# Patient Record
Sex: FEMALE
[De-identification: names, ages, dates, MRNs, and addresses within clinical notes are randomized per-mention and may not be internally consistent; named-entity substitution may affect disease eponyms.]

---

## 2018-12-05 ENCOUNTER — HOSPITAL ENCOUNTER (OUTPATIENT)
Dept: HOSPITAL 90 - OIH | Age: 78
Discharge: HOME | End: 2018-12-05
Attending: FAMILY MEDICINE
Payer: COMMERCIAL
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
## 2018-12-05 DIAGNOSIS — S09.90XA: ICD-10-CM
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
## 2018-12-05 DIAGNOSIS — G31.9: Primary | ICD-10-CM

## 2018-12-05 PROCEDURE — 70450 CT HEAD/BRAIN W/O DYE: CPT

## 2019-09-09 ENCOUNTER — HOSPITAL ENCOUNTER (OUTPATIENT)
Dept: HOSPITAL 90 - RAH | Age: 79
Discharge: HOME | End: 2019-09-09
Attending: FAMILY MEDICINE
Payer: COMMERCIAL

## 2019-09-09 DIAGNOSIS — Z12.31: Primary | ICD-10-CM

## 2019-09-09 PROCEDURE — 77067 SCR MAMMO BI INCL CAD: CPT

## 2020-09-13 ENCOUNTER — HOSPITAL ENCOUNTER (OUTPATIENT)
Dept: HOSPITAL 90 - EDH | Age: 80
Setting detail: OBSERVATION
LOS: 1 days | Discharge: HOME | End: 2020-09-14
Attending: INTERNAL MEDICINE | Admitting: INTERNAL MEDICINE
Payer: COMMERCIAL

## 2020-09-13 VITALS — HEIGHT: 63 IN | BODY MASS INDEX: 14.35 KG/M2 | WEIGHT: 81 LBS

## 2020-09-13 VITALS — SYSTOLIC BLOOD PRESSURE: 148 MMHG | DIASTOLIC BLOOD PRESSURE: 71 MMHG

## 2020-09-13 VITALS — DIASTOLIC BLOOD PRESSURE: 72 MMHG | SYSTOLIC BLOOD PRESSURE: 161 MMHG
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
## 2020-09-13 DIAGNOSIS — S32.591A: Primary | ICD-10-CM

## 2020-09-13 DIAGNOSIS — D72.829: ICD-10-CM
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
## 2020-09-13 DIAGNOSIS — E87.6: ICD-10-CM

## 2020-09-13 LAB
ALBUMIN SERPL-MCNC: 2.9 G/DL (ref 3.5–5)
ALT SERPL-CCNC: 30 U/L (ref 12–78)
AST SERPL-CCNC: 14 U/L (ref 10–37)
BASOPHILS NFR BLD AUTO: 0.3 % (ref 0–5)
BILIRUB SERPL-MCNC: 0.4 MG/DL (ref 0.2–1)
BUN SERPL-MCNC: 24 MG/DL (ref 7–18)
CHLORIDE SERPL-SCNC: 106 MMOL/L (ref 101–111)
CK SERPL-CCNC: 34 U/L (ref 21–232)
CO2 SERPL-SCNC: 28 MMOL/L (ref 21–32)
CREAT SERPL-MCNC: 0.7 MG/DL (ref 0.5–1.5)
EOSINOPHIL NFR BLD AUTO: 0.9 % (ref 0–8)
ERYTHROCYTE [DISTWIDTH] IN BLOOD BY AUTOMATED COUNT: 13.6 % (ref 11–15.5)
GFR SERPL CREATININE-BSD FRML MDRD: 86 ML/MIN (ref 60–?)
GLUCOSE SERPL-MCNC: 138 MG/DL (ref 70–105)
HCT VFR BLD AUTO: 35.2 % (ref 36–48)
LYMPHOCYTES NFR SPEC AUTO: 8 % (ref 21–51)
MCH RBC QN AUTO: 27.3 PG (ref 27–33)
MCHC RBC AUTO-ENTMCNC: 31.8 G/DL (ref 32–36)
MCV RBC AUTO: 85.9 FL (ref 79–99)
MONOCYTES NFR BLD AUTO: 5.9 % (ref 3–13)
NEUTROPHILS NFR BLD AUTO: 83.6 % (ref 40–77)
NRBC BLD MANUAL-RTO: 0 % (ref 0–0.19)
PLATELET # BLD AUTO: 338 K/UL (ref 130–400)
POTASSIUM SERPL-SCNC: 3.4 MMOL/L (ref 3.5–5.1)
PROT SERPL-MCNC: 6.7 G/DL (ref 6–8.3)
RBC # BLD AUTO: 4.1 MIL/UL (ref 4–5.5)
SODIUM SERPL-SCNC: 142 MMOL/L (ref 136–145)
WBC # BLD AUTO: 16.4 K/UL (ref 4.8–10.8)

## 2020-09-13 PROCEDURE — 80053 COMPREHEN METABOLIC PANEL: CPT

## 2020-09-13 PROCEDURE — 80048 BASIC METABOLIC PNL TOTAL CA: CPT

## 2020-09-13 PROCEDURE — 36415 COLL VENOUS BLD VENIPUNCTURE: CPT

## 2020-09-13 PROCEDURE — 71045 X-RAY EXAM CHEST 1 VIEW: CPT

## 2020-09-13 PROCEDURE — 82550 ASSAY OF CK (CPK): CPT

## 2020-09-13 PROCEDURE — 70450 CT HEAD/BRAIN W/O DYE: CPT

## 2020-09-13 PROCEDURE — 72128 CT CHEST SPINE W/O DYE: CPT

## 2020-09-13 PROCEDURE — 96372 THER/PROPH/DIAG INJ SC/IM: CPT

## 2020-09-13 PROCEDURE — 72192 CT PELVIS W/O DYE: CPT

## 2020-09-13 PROCEDURE — 93005 ELECTROCARDIOGRAM TRACING: CPT

## 2020-09-13 PROCEDURE — 85025 COMPLETE CBC W/AUTO DIFF WBC: CPT

## 2020-09-13 PROCEDURE — 72125 CT NECK SPINE W/O DYE: CPT

## 2020-09-13 PROCEDURE — 99291 CRITICAL CARE FIRST HOUR: CPT

## 2020-09-13 PROCEDURE — 84484 ASSAY OF TROPONIN QUANT: CPT

## 2020-09-13 PROCEDURE — 72131 CT LUMBAR SPINE W/O DYE: CPT

## 2020-09-13 PROCEDURE — 72170 X-RAY EXAM OF PELVIS: CPT

## 2020-09-13 RX ADMIN — HEPARIN SODIUM SCH UNIT: 5000 INJECTION, SOLUTION INTRAVENOUS; SUBCUTANEOUS at 20:15

## 2020-09-14 VITALS — SYSTOLIC BLOOD PRESSURE: 150 MMHG | DIASTOLIC BLOOD PRESSURE: 72 MMHG

## 2020-09-14 VITALS — DIASTOLIC BLOOD PRESSURE: 64 MMHG | SYSTOLIC BLOOD PRESSURE: 136 MMHG

## 2020-09-14 VITALS — SYSTOLIC BLOOD PRESSURE: 112 MMHG | DIASTOLIC BLOOD PRESSURE: 58 MMHG

## 2020-09-14 VITALS — DIASTOLIC BLOOD PRESSURE: 82 MMHG | SYSTOLIC BLOOD PRESSURE: 153 MMHG

## 2020-09-14 VITALS — SYSTOLIC BLOOD PRESSURE: 151 MMHG | DIASTOLIC BLOOD PRESSURE: 78 MMHG

## 2020-09-14 LAB
BASOPHILS NFR BLD AUTO: 0.5 % (ref 0–5)
BUN SERPL-MCNC: 15 MG/DL (ref 7–18)
CHLORIDE SERPL-SCNC: 104 MMOL/L (ref 101–111)
CO2 SERPL-SCNC: 27 MMOL/L (ref 21–32)
CREAT SERPL-MCNC: 0.6 MG/DL (ref 0.5–1.5)
EOSINOPHIL NFR BLD AUTO: 2.4 % (ref 0–8)
ERYTHROCYTE [DISTWIDTH] IN BLOOD BY AUTOMATED COUNT: 13.4 % (ref 11–15.5)
GFR SERPL CREATININE-BSD FRML MDRD: 102 ML/MIN (ref 60–?)
GLUCOSE SERPL-MCNC: 99 MG/DL (ref 70–105)
HCT VFR BLD AUTO: 34.6 % (ref 36–48)
LYMPHOCYTES NFR SPEC AUTO: 9.4 % (ref 21–51)
MCH RBC QN AUTO: 27 PG (ref 27–33)
MCHC RBC AUTO-ENTMCNC: 31.5 G/DL (ref 32–36)
MCV RBC AUTO: 85.9 FL (ref 79–99)
MONOCYTES NFR BLD AUTO: 6.2 % (ref 3–13)
NEUTROPHILS NFR BLD AUTO: 80.3 % (ref 40–77)
NRBC BLD MANUAL-RTO: 0 % (ref 0–0.19)
PLATELET # BLD AUTO: 324 K/UL (ref 130–400)
POTASSIUM SERPL-SCNC: 3.8 MMOL/L (ref 3.5–5.1)
RBC # BLD AUTO: 4.03 MIL/UL (ref 4–5.5)
SODIUM SERPL-SCNC: 139 MMOL/L (ref 136–145)
WBC # BLD AUTO: 9.7 K/UL (ref 4.8–10.8)

## 2020-09-14 RX ADMIN — HEPARIN SODIUM SCH UNIT: 5000 INJECTION, SOLUTION INTRAVENOUS; SUBCUTANEOUS at 10:04

## 2020-09-14 NOTE — NUR
[id number] patient signed CONNOLLY Letter, I faxed MOON Letter to [phone] and placed in chart under 
consent tab.

## 2020-09-14 NOTE — NUR
DCP

CM met with pt discussed dc plans. Pt is semi-independent prior to admission, was living at 
home alone until recently been living w/daughter Rupa and Vibha. Pt has a cane, shower 
chair, handicapped built restroom w/raised toilet and hand bars. Feels safe to go back home, 
declined placement, prefer to go home. With pt's permission in room called daughter on 
facesheet. Spoke to Rupa [phone].  All info above verified w/daughter and is 
correct. As per daughter Rupa sister and herself have arranged for pt to have provider 
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 Aware pt 
declined placement, daughter agreeable w/pt's decision. Daughter verbalized sister and 
herself are proactive w/pt's care and will be available after work hours, in the mean time 
provider stays w/pt at all time once pt dc. DC plan to home once stable. CM to cont to 
follow up.

-------------------------------------------------------------------------------

Addendum: 09/14/20 at 1242 by TYESHA MCGUIRE LVN CM

-------------------------------------------------------------------------------

Amended: Links added.